# Patient Record
Sex: FEMALE | Race: WHITE | NOT HISPANIC OR LATINO | URBAN - METROPOLITAN AREA
[De-identification: names, ages, dates, MRNs, and addresses within clinical notes are randomized per-mention and may not be internally consistent; named-entity substitution may affect disease eponyms.]

---

## 2017-12-06 ENCOUNTER — EMERGENCY (EMERGENCY)
Facility: HOSPITAL | Age: 47
LOS: 0 days | Discharge: HOME | End: 2017-12-06
Attending: EMERGENCY MEDICINE | Admitting: EMERGENCY MEDICINE
Payer: COMMERCIAL

## 2017-12-06 VITALS
RESPIRATION RATE: 16 BRPM | DIASTOLIC BLOOD PRESSURE: 73 MMHG | SYSTOLIC BLOOD PRESSURE: 110 MMHG | TEMPERATURE: 98 F | OXYGEN SATURATION: 100 % | HEART RATE: 82 BPM

## 2017-12-06 VITALS
OXYGEN SATURATION: 100 % | TEMPERATURE: 98 F | RESPIRATION RATE: 18 BRPM | DIASTOLIC BLOOD PRESSURE: 68 MMHG | HEART RATE: 80 BPM | SYSTOLIC BLOOD PRESSURE: 118 MMHG

## 2017-12-06 DIAGNOSIS — R05 COUGH: ICD-10-CM

## 2017-12-06 DIAGNOSIS — J18.9 PNEUMONIA, UNSPECIFIED ORGANISM: ICD-10-CM

## 2017-12-06 LAB
ALBUMIN SERPL ELPH-MCNC: 3.1 G/DL — LOW (ref 3.3–5)
ALP SERPL-CCNC: 105 U/L — SIGNIFICANT CHANGE UP (ref 40–120)
ALT FLD-CCNC: 17 U/L — SIGNIFICANT CHANGE UP (ref 12–78)
ANION GAP SERPL CALC-SCNC: 5 MMOL/L — SIGNIFICANT CHANGE UP (ref 5–17)
AST SERPL-CCNC: 13 U/L — LOW (ref 15–37)
BASOPHILS # BLD AUTO: 0.1 K/UL — SIGNIFICANT CHANGE UP (ref 0–0.2)
BASOPHILS NFR BLD AUTO: 1.1 % — SIGNIFICANT CHANGE UP (ref 0–2)
BILIRUB SERPL-MCNC: 0.3 MG/DL — SIGNIFICANT CHANGE UP (ref 0.2–1.2)
BUN SERPL-MCNC: 12 MG/DL — SIGNIFICANT CHANGE UP (ref 7–23)
CALCIUM SERPL-MCNC: 8.5 MG/DL — SIGNIFICANT CHANGE UP (ref 8.5–10.1)
CHLORIDE SERPL-SCNC: 107 MMOL/L — SIGNIFICANT CHANGE UP (ref 96–108)
CO2 SERPL-SCNC: 29 MMOL/L — SIGNIFICANT CHANGE UP (ref 22–31)
CREAT SERPL-MCNC: 0.48 MG/DL — LOW (ref 0.5–1.3)
EOSINOPHIL # BLD AUTO: 0.1 K/UL — SIGNIFICANT CHANGE UP (ref 0–0.5)
EOSINOPHIL NFR BLD AUTO: 2.1 % — SIGNIFICANT CHANGE UP (ref 0–6)
GLUCOSE SERPL-MCNC: 63 MG/DL — LOW (ref 70–99)
HCT VFR BLD CALC: 38.1 % — SIGNIFICANT CHANGE UP (ref 34.5–45)
HGB BLD-MCNC: 12.2 G/DL — SIGNIFICANT CHANGE UP (ref 11.5–15.5)
LYMPHOCYTES # BLD AUTO: 2.3 K/UL — SIGNIFICANT CHANGE UP (ref 1–3.3)
LYMPHOCYTES # BLD AUTO: 33.8 % — SIGNIFICANT CHANGE UP (ref 13–44)
MCHC RBC-ENTMCNC: 29.8 PG — SIGNIFICANT CHANGE UP (ref 27–34)
MCHC RBC-ENTMCNC: 32 GM/DL — SIGNIFICANT CHANGE UP (ref 32–36)
MCV RBC AUTO: 92.9 FL — SIGNIFICANT CHANGE UP (ref 80–100)
MONOCYTES # BLD AUTO: 0.4 K/UL — SIGNIFICANT CHANGE UP (ref 0–0.9)
MONOCYTES NFR BLD AUTO: 5.8 % — SIGNIFICANT CHANGE UP (ref 2–14)
NEUTROPHILS # BLD AUTO: 3.8 K/UL — SIGNIFICANT CHANGE UP (ref 1.8–7.4)
NEUTROPHILS NFR BLD AUTO: 57.2 % — SIGNIFICANT CHANGE UP (ref 43–77)
PLATELET # BLD AUTO: 372 K/UL — SIGNIFICANT CHANGE UP (ref 150–400)
POTASSIUM SERPL-MCNC: 3.6 MMOL/L — SIGNIFICANT CHANGE UP (ref 3.5–5.3)
POTASSIUM SERPL-SCNC: 3.6 MMOL/L — SIGNIFICANT CHANGE UP (ref 3.5–5.3)
PROT SERPL-MCNC: 6.1 GM/DL — SIGNIFICANT CHANGE UP (ref 6–8.3)
RBC # BLD: 4.1 M/UL — SIGNIFICANT CHANGE UP (ref 3.8–5.2)
RBC # FLD: 12.3 % — SIGNIFICANT CHANGE UP (ref 10.3–14.5)
SODIUM SERPL-SCNC: 141 MMOL/L — SIGNIFICANT CHANGE UP (ref 135–145)
WBC # BLD: 6.7 K/UL — SIGNIFICANT CHANGE UP (ref 3.8–10.5)
WBC # FLD AUTO: 6.7 K/UL — SIGNIFICANT CHANGE UP (ref 3.8–10.5)

## 2017-12-06 PROCEDURE — 99285 EMERGENCY DEPT VISIT HI MDM: CPT

## 2017-12-06 PROCEDURE — 71250 CT THORAX DX C-: CPT | Mod: 26

## 2017-12-06 RX ORDER — CEFEPIME 1 G/1
2000 INJECTION, POWDER, FOR SOLUTION INTRAMUSCULAR; INTRAVENOUS ONCE
Qty: 0 | Refills: 0 | Status: COMPLETED | OUTPATIENT
Start: 2017-12-06 | End: 2017-12-06

## 2017-12-06 RX ORDER — CEFEPIME 1 G/1
INJECTION, POWDER, FOR SOLUTION INTRAMUSCULAR; INTRAVENOUS
Qty: 0 | Refills: 0 | Status: DISCONTINUED | OUTPATIENT
Start: 2017-12-06 | End: 2017-12-06

## 2017-12-06 RX ORDER — CEFEPIME 1 G/1
2000 INJECTION, POWDER, FOR SOLUTION INTRAMUSCULAR; INTRAVENOUS EVERY 12 HOURS
Qty: 0 | Refills: 0 | Status: DISCONTINUED | OUTPATIENT
Start: 2017-12-06 | End: 2017-12-06

## 2017-12-06 RX ADMIN — CEFEPIME 100 MILLIGRAM(S): 1 INJECTION, POWDER, FOR SOLUTION INTRAMUSCULAR; INTRAVENOUS at 12:23

## 2017-12-06 NOTE — ADVANCED PRACTICE NURSE CONSULT - ASSESSMENT
Rec'd. order to place Midline catheter for patient in ED that was sent to have line placed for long-term IV abx. Explained risks and benefits and obtained verbal consent for midline. Patient A&Ox3 and verbalized understanding. Pt. states she had a midline two years ago and knows how to care for and use it. Inserted Bard Powerglide Pro Midline to left cephalic 18g 10 cm. length via ultrasound guidance with brisk blood return, flushes well w/20 ml. NS. Endcap placed, pt. tolerated well. No CXR needed for midline. Report given to district nurse. Lot # [id number].

## 2017-12-06 NOTE — ED ADULT NURSE NOTE - OBJECTIVE STATEMENT
pt has recurrent Pna , HX CYST in lungs as a child , dm 1 , on insulin pump , pt sent for midline placement by dr. cueva

## 2017-12-06 NOTE — CONSULT NOTE ADULT - ASSESSMENT
48 y/o female with h/o DM type 2 on insulin pump, congenittal cystic lung disease s/p surgery at age 5, prior pneumonia 2 years afo treated with cefepime IV for 4 weeks was sent to ER for IV abx therapy. She developed SOB, dry cough, felt feverish about 3-4 weeks ago. She had a CT chest that showed a LLL pulmonary infiltrate. She received augmentin PO for 2 weeks with partial response (her fenerish feeling improved). Her augmentin was restarted for 10 more days, but she still has a dry cough, feels SOB and fatigue. She was sent for further IV abx.    1. Persistent cough and dyspnea. Left side pneumonia. Cystic lung disease.  -concerned about the possibility of persistent infectious process; the patient did not respond to oral augmentin  -possible MDR organism, mycobacteria and/or fungal infection ?malignancy  -case reviewed with Dr. Hawkins ?bronchoscopy if the patient is agreeable for further diagnosis  -repeat CT chest  -give cefepime 2 gm IV q12h  -reason for abx use and side effects reviewed with patient; monitor BMP   -midline   -home IV abx set up  -monitor temps  -f/u CBC, CMP weekly  -supportive care  2. Other issues:   -care per medicine

## 2017-12-06 NOTE — CONSULT NOTE ADULT - SUBJECTIVE AND OBJECTIVE BOX
Patient is a 47y old  Female who presents with a chief complaint of SOB and cough.  HPI:  48 y/o female with h/o DM type 2 on insulin pump, congenittal cystic lung disease s/p surgery at age 5, prior pneumonia 2 years afo treated with cefepime IV for 4 weeks was sent to ER for IV abx therapy. She developed SOB, dry cough, felt feverish about 3-4 weeks ago. She had a CT chest that showed a LLL pulmonary infiltrate. She received augmentin PO for 2 weeks with partial response (her fenerish feeling improved). Her augmentin was restarted for 10 more days, but she still has a dry cough, feels SOB and fatigue. She was sent for further IV abx.    PMH: as above  PSH: as above  Meds: per reconciliation sheet, noted below  MEDICATIONS  (STANDING):  cefepime  IVPB        MEDICATIONS  (PRN):    Allergies    sulfa drugs (Unknown)    Intolerances      Social: no smoking, no alcohol, no illegal drugs; no recent travel, no exposure to TB  FAMILY HISTORY:    ROS: the patient denies fever, no chills, no HA, no dizziness, no sore throat, no blurry vision, no CP, no palpitations, has SOB, has dry cough, no abdominal pain, no diarrhea, no N/V, no dysuria, no leg pain, no claudication, no rash, no joint aches, no rectal pain or bleeding, no night sweats    Vital Signs Last 24 Hrs  T(C): 36.6 (06 Dec 2017 08:03), Max: 36.6 (06 Dec 2017 08:03)  T(F): 97.8 (06 Dec 2017 08:03), Max: 97.8 (06 Dec 2017 08:03)  HR: 82 (06 Dec 2017 08:03) (82 - 82)  BP: 110/73 (06 Dec 2017 08:03) (110/73 - 110/73)  BP(mean): --  RR: 16 (06 Dec 2017 08:03) (16 - 16)  SpO2: 100% (06 Dec 2017 08:03) (100% - 100%)  Daily Height in cm: 157.48 (06 Dec 2017 08:08)    Daily     PE:    Constitutional: frail looking  HEENT: NC/AT, EOMI, PERRLA  Neck: supple  Back: no tenderness  Respiratory: clear; mild decreased BS at left base  Cardiovascular: S1S2 regular, no murmurs  Abdomen: soft, not tender, not distended, positive BS  Genitourinary: no LN palpable  Rectal: deferred  Musculoskeletal: no muscle tenderness, no joint swelling or tenderness  Extremities: no pedal edema  Neurological: AxOx3, moving all extremities  Skin: no rashes    Labs:    pending               Radiology:    CT chest from 11/16/2017 reviewed: marked alveolar consolidation superior segment LLL and posterior segment YUNIER    Advanced directives addressed: full resuscitation

## 2017-12-06 NOTE — ED ADULT NURSE REASSESSMENT NOTE - NS ED NURSE REASSESS COMMENT FT1
Pt received Midline placement by Iv team, received Dr. cueva consult , Cefepime 2G IV given , pt is receiving  consult for IV antibiotics set up

## 2018-01-11 NOTE — ASU PATIENT PROFILE, ADULT - PMH
Controlled diabetes mellitus type 1 without complications    Recurrent pneumonia Controlled diabetes mellitus type 1 without complications    Lung mass  cysts on left lung  Recurrent pneumonia

## 2018-01-12 ENCOUNTER — OUTPATIENT (OUTPATIENT)
Dept: OUTPATIENT SERVICES | Facility: HOSPITAL | Age: 48
LOS: 1 days | Discharge: HOME | End: 2018-01-12
Payer: COMMERCIAL

## 2018-01-12 ENCOUNTER — RESULT REVIEW (OUTPATIENT)
Age: 48
End: 2018-01-12

## 2018-01-12 VITALS
OXYGEN SATURATION: 100 % | RESPIRATION RATE: 14 BRPM | HEART RATE: 88 BPM | DIASTOLIC BLOOD PRESSURE: 61 MMHG | TEMPERATURE: 99 F | SYSTOLIC BLOOD PRESSURE: 109 MMHG

## 2018-01-12 VITALS
HEIGHT: 62 IN | HEART RATE: 80 BPM | DIASTOLIC BLOOD PRESSURE: 62 MMHG | RESPIRATION RATE: 16 BRPM | OXYGEN SATURATION: 100 % | TEMPERATURE: 98 F | WEIGHT: 106.26 LBS | SYSTOLIC BLOOD PRESSURE: 91 MMHG

## 2018-01-12 DIAGNOSIS — Z98.890 OTHER SPECIFIED POSTPROCEDURAL STATES: Chronic | ICD-10-CM

## 2018-01-12 DIAGNOSIS — J18.9 PNEUMONIA, UNSPECIFIED ORGANISM: ICD-10-CM

## 2018-01-12 DIAGNOSIS — J98.4 OTHER DISORDERS OF LUNG: ICD-10-CM

## 2018-01-12 LAB
ANION GAP SERPL CALC-SCNC: 4 MMOL/L — LOW (ref 5–17)
BUN SERPL-MCNC: 13 MG/DL — SIGNIFICANT CHANGE UP (ref 7–23)
CALCIUM SERPL-MCNC: 9.1 MG/DL — SIGNIFICANT CHANGE UP (ref 8.5–10.1)
CHLORIDE SERPL-SCNC: 106 MMOL/L — SIGNIFICANT CHANGE UP (ref 96–108)
CO2 SERPL-SCNC: 28 MMOL/L — SIGNIFICANT CHANGE UP (ref 22–31)
CREAT SERPL-MCNC: 0.61 MG/DL — SIGNIFICANT CHANGE UP (ref 0.5–1.3)
GLUCOSE BLDC GLUCOMTR-MCNC: 111 MG/DL — HIGH (ref 70–99)
GLUCOSE BLDC GLUCOMTR-MCNC: 131 MG/DL — HIGH (ref 70–99)
GLUCOSE SERPL-MCNC: 134 MG/DL — HIGH (ref 70–99)
GRAM STN FLD: SIGNIFICANT CHANGE UP
HCG UR QL: NEGATIVE — SIGNIFICANT CHANGE UP
HCT VFR BLD CALC: 46.9 % — HIGH (ref 34.5–45)
HGB BLD-MCNC: 14.8 G/DL — SIGNIFICANT CHANGE UP (ref 11.5–15.5)
INR BLD: 0.85 RATIO — LOW (ref 0.88–1.16)
MCHC RBC-ENTMCNC: 28.6 PG — SIGNIFICANT CHANGE UP (ref 27–34)
MCHC RBC-ENTMCNC: 31.6 GM/DL — LOW (ref 32–36)
MCV RBC AUTO: 90.4 FL — SIGNIFICANT CHANGE UP (ref 80–100)
PLATELET # BLD AUTO: 485 K/UL — HIGH (ref 150–400)
POTASSIUM SERPL-MCNC: 4.4 MMOL/L — SIGNIFICANT CHANGE UP (ref 3.5–5.3)
POTASSIUM SERPL-SCNC: 4.4 MMOL/L — SIGNIFICANT CHANGE UP (ref 3.5–5.3)
PROTHROM AB SERPL-ACNC: 9.2 SEC — LOW (ref 9.8–12.7)
RBC # BLD: 5.19 M/UL — SIGNIFICANT CHANGE UP (ref 3.8–5.2)
RBC # FLD: 12.9 % — SIGNIFICANT CHANGE UP (ref 10.3–14.5)
SODIUM SERPL-SCNC: 138 MMOL/L — SIGNIFICANT CHANGE UP (ref 135–145)
SPECIMEN SOURCE: SIGNIFICANT CHANGE UP
WBC # BLD: 6.3 K/UL — SIGNIFICANT CHANGE UP (ref 3.8–10.5)
WBC # FLD AUTO: 6.3 K/UL — SIGNIFICANT CHANGE UP (ref 3.8–10.5)

## 2018-01-12 PROCEDURE — 88108 CYTOPATH CONCENTRATE TECH: CPT | Mod: 26

## 2018-01-12 RX ORDER — FENTANYL CITRATE 50 UG/ML
25 INJECTION INTRAVENOUS
Qty: 0 | Refills: 0 | Status: DISCONTINUED | OUTPATIENT
Start: 2018-01-12 | End: 2018-01-12

## 2018-01-12 RX ORDER — ONDANSETRON 8 MG/1
4 TABLET, FILM COATED ORAL ONCE
Qty: 0 | Refills: 0 | Status: DISCONTINUED | OUTPATIENT
Start: 2018-01-12 | End: 2018-01-12

## 2018-01-12 RX ORDER — ALPRAZOLAM 0.25 MG
0 TABLET ORAL
Qty: 0 | Refills: 0 | COMMUNITY

## 2018-01-12 NOTE — ASU DISCHARGE PLAN (ADULT/PEDIATRIC). - NURSING INSTRUCTIONS
Follow the multicolored discharge instruction sheet given to you.  Remember to  CALL the DOCTOR if:  You have any pain that appears to be getting worse, or you get no relief from pain after taking the pain medicine prescribed for you.  You have  not urinated 8 hours after surgery or have any difficulty urinating.

## 2018-01-12 NOTE — BRIEF OPERATIVE NOTE - PRE-OP DX
Pneumonia of left lung due to infectious organism, unspecified part of lung  01/12/2018  patient left lower cystic lung disease with recurrent pneumonia  Active  Lesly Hawkins

## 2018-01-12 NOTE — BRIEF OPERATIVE NOTE - PROCEDURE
<<-----Click on this checkbox to enter Procedure Bronchoscopy  01/12/2018  patient underwent bronchoscopy under local and iv sedation . vocal inspected with inspection of air ways on both sides . no endobronchial lesions were identifid  and washing were obtain from the left lower bronchus and send for the cultures  Active  LYELLA

## 2018-01-12 NOTE — BRIEF OPERATIVE NOTE - POST-OP DX
Pneumonia of left lung due to infectious organism, unspecified part of lung  01/12/2018    Active  Lesly Hawkins

## 2018-01-13 LAB
NIGHT BLUE STAIN TISS: SIGNIFICANT CHANGE UP
SPECIMEN SOURCE: SIGNIFICANT CHANGE UP

## 2018-01-14 LAB
CULTURE RESULTS: SIGNIFICANT CHANGE UP
SPECIMEN SOURCE: SIGNIFICANT CHANGE UP

## 2018-01-16 LAB — NON-GYN CYTOLOGY SPEC: SIGNIFICANT CHANGE UP

## 2018-01-22 DIAGNOSIS — Z79.4 LONG TERM (CURRENT) USE OF INSULIN: ICD-10-CM

## 2018-01-22 DIAGNOSIS — Z88.2 ALLERGY STATUS TO SULFONAMIDES: ICD-10-CM

## 2018-01-22 DIAGNOSIS — E10.9 TYPE 1 DIABETES MELLITUS WITHOUT COMPLICATIONS: ICD-10-CM

## 2018-01-22 DIAGNOSIS — J18.9 PNEUMONIA, UNSPECIFIED ORGANISM: ICD-10-CM

## 2018-01-22 DIAGNOSIS — J98.4 OTHER DISORDERS OF LUNG: ICD-10-CM

## 2018-02-10 LAB
CULTURE RESULTS: SIGNIFICANT CHANGE UP
SPECIMEN SOURCE: SIGNIFICANT CHANGE UP

## 2018-02-24 LAB — NIGHT BLUE STAIN TISS: SIGNIFICANT CHANGE UP

## 2018-03-03 LAB
CULTURE RESULTS: SIGNIFICANT CHANGE UP
SPECIMEN SOURCE: SIGNIFICANT CHANGE UP

## 2019-10-29 ENCOUNTER — EMERGENCY (EMERGENCY)
Facility: HOSPITAL | Age: 49
LOS: 0 days | Discharge: HOME | End: 2019-10-30
Attending: EMERGENCY MEDICINE
Payer: COMMERCIAL

## 2019-10-29 VITALS
DIASTOLIC BLOOD PRESSURE: 81 MMHG | HEART RATE: 100 BPM | OXYGEN SATURATION: 99 % | TEMPERATURE: 98 F | RESPIRATION RATE: 18 BRPM | SYSTOLIC BLOOD PRESSURE: 131 MMHG

## 2019-10-29 VITALS — WEIGHT: 91.05 LBS

## 2019-10-29 DIAGNOSIS — Z98.890 OTHER SPECIFIED POSTPROCEDURAL STATES: Chronic | ICD-10-CM
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
## 2019-10-29 DIAGNOSIS — Z23 ENCOUNTER FOR IMMUNIZATION: ICD-10-CM

## 2019-10-29 DIAGNOSIS — S61.012A LACERATION WITHOUT FOREIGN BODY OF LEFT THUMB WITHOUT DAMAGE TO NAIL, INITIAL ENCOUNTER: ICD-10-CM

## 2019-10-29 DIAGNOSIS — Z87.01 PERSONAL HISTORY OF PNEUMONIA (RECURRENT): ICD-10-CM
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
## 2019-10-29 DIAGNOSIS — Z88.2 ALLERGY STATUS TO SULFONAMIDES: ICD-10-CM

## 2019-10-29 DIAGNOSIS — E10.9 TYPE 1 DIABETES MELLITUS WITHOUT COMPLICATIONS: ICD-10-CM

## 2019-10-29 PROCEDURE — 73140 X-RAY EXAM OF FINGER(S): CPT | Mod: 26,LT

## 2019-10-29 PROCEDURE — 99283 EMERGENCY DEPT VISIT LOW MDM: CPT | Mod: 25

## 2019-10-29 PROCEDURE — 12001 RPR S/N/AX/GEN/TRNK 2.5CM/<: CPT

## 2019-10-29 PROCEDURE — 73140 X-RAY EXAM OF FINGER(S): CPT | Mod: LT

## 2019-10-29 PROCEDURE — 90471 IMMUNIZATION ADMIN: CPT

## 2019-10-29 PROCEDURE — 99284 EMERGENCY DEPT VISIT MOD MDM: CPT

## 2019-10-29 PROCEDURE — 90715 TDAP VACCINE 7 YRS/> IM: CPT

## 2019-10-29 RX ORDER — CEPHALEXIN 500 MG
500 CAPSULE ORAL ONCE
Refills: 0 | Status: DISCONTINUED | OUTPATIENT
Start: 2019-10-29 | End: 2019-10-30

## 2019-10-29 RX ORDER — TETANUS TOXOID, REDUCED DIPHTHERIA TOXOID AND ACELLULAR PERTUSSIS VACCINE, ADSORBED 5; 2.5; 8; 8; 2.5 [IU]/.5ML; [IU]/.5ML; UG/.5ML; UG/.5ML; UG/.5ML
0.5 SUSPENSION INTRAMUSCULAR ONCE
Refills: 0 | Status: COMPLETED | OUTPATIENT
Start: 2019-10-29 | End: 2019-10-29

## 2019-10-29 RX ADMIN — TETANUS TOXOID, REDUCED DIPHTHERIA TOXOID AND ACELLULAR PERTUSSIS VACCINE, ADSORBED 0.5 MILLILITER(S): 5; 2.5; 8; 8; 2.5 SUSPENSION INTRAMUSCULAR at 23:02

## 2019-10-29 NOTE — ED ADULT NURSE NOTE - OBJECTIVE STATEMENT
pt reports laceration to left thumb s/p slicing food. RN unable to visualize would because it was wrapped prior to arrival. No drainage evident on dressing. pt in no acute distress at this time. GLORIA bauman at bedside for laceration repair. will administer abx and dc patient.

## 2019-10-29 NOTE — ED ADULT NURSE NOTE - NSIMPLEMENTINTERV_GEN_ALL_ED
Implemented All Universal Safety Interventions:  [place] to call system. Call bell, personal items and telephone within reach. Instruct patient to call for assistance. Room bathroom lighting operational. Non-slip footwear when patient is off stretcher. Physically safe environment: no spills, clutter or unnecessary equipment. Stretcher in lowest position, wheels locked, appropriate side rails in place.

## 2019-10-29 NOTE — ED ADULT NURSE NOTE - PMH
Controlled diabetes mellitus type 1 without complications    Lung mass  cysts on left lung  Recurrent pneumonia

## 2019-10-30 PROBLEM — E10.9 TYPE 1 DIABETES MELLITUS WITHOUT COMPLICATIONS: Chronic | Status: ACTIVE | Noted: 2017-12-06

## 2019-10-30 PROBLEM — R91.8 OTHER NONSPECIFIC ABNORMAL FINDING OF LUNG FIELD: Chronic | Status: ACTIVE | Noted: 2018-01-12

## 2019-10-30 PROBLEM — J18.9 PNEUMONIA, UNSPECIFIED ORGANISM: Chronic | Status: ACTIVE | Noted: 2017-12-06

## 2019-10-30 NOTE — ED STATDOCS - PATIENT PORTAL LINK FT
You can access the FollowMyHealth Patient Portal offered by [facility] by registering at the following website: [email or web address]. By joining [employer]’s FollowMyHealth portal, you will also be able to view your health information using other applications (apps) compatible with our system.

## 2019-10-30 NOTE — ED STATDOCS - SKIN, MLM
skin normal color for race, warm, dry and intact. LEFT THUMB: +1.5cm skin flap type laceration noted distal phalanx of left 1st digit over radial aspect. No tendon deficit. FROM left 1st digit. 5/5 motor and sensory. NVI. Cap refill less than 2 sec. No injury to thumbnail or nailbed. No bony deformity.

## 2019-10-30 NOTE — ED PROCEDURE NOTE - CPROC ED ANATOMIC LOCATION1
hand/left/LEFT THUMB: +1.5cm skin flap type laceration noted distal phalanx of left 1st digit over radial aspect. No tendon deficit. FROM left 1st digit. 5/5 motor and sensory. NVI. Cap refill less than 2 sec. No injury to thumbnail or nailbed. No bony deformity.

## 2019-10-30 NOTE — ED STATDOCS - CLINICAL SUMMARY MEDICAL DECISION MAKING FREE TEXT BOX
48 yo WF ambulatory to ED w/ accidental L thumb lac by sharp knife by slicing motion.  + NVI.  1.5 cm flap-type lac distal L thumb, mild oozing of blood.  Td NOT UTD.  Plan: Tdap, XRs L thumb, lac repair. 50 yo WF ambulatory to ED w/ accidental L thumb lac by sharp knife by slicing motion.  + NVI.  1.5 cm flap-type lac distal L thumb, mild oozing of blood.  Td NOT UTD.  Plan: Tdap, XRs L thumb, lac repair, po Keflex admin. in ED. 50 yo WF ambulatory to ED w/ accidental L thumb lac by sharp knife by slicing motion.  + NVI.  1.5 cm flap-type lac distal L thumb, mild oozing of blood.  Td NOT UTD.  Plan: Tdap, XRs L thumb, lac repair.

## 2019-10-30 NOTE — ED STATDOCS - OBJECTIVE STATEMENT
Patient is a 49 year old female with PMHx of lung cysts and Diabetes who presented to [facility] with left thumb laceration. Patient accidentally stabbed her left thumb with a knife when working in the kitchen. DENIES tingling/numbness/weakness in left thumb and/or distal to the injury. No excessive bleeding. TDAP NOT up to date. ~GLORIA Vargas Patient is a 49 year old female with PMHx of lung cysts and Diabetes who presented to ED with left thumb laceration. Patient accidentally stabbed her left thumb with a knife when working in the kitchen. DENIES tingling/numbness/weakness in left thumb and/or distal to the injury. No excessive bleeding. TDAP NOT up to date. ~GLORIA Vargas Dr., ED Attdg MD Note;   Pt seen/evaluated in ED on 10/29.  ED chart completed 10/31.    50 yo WF, h/o DMt1 on insulin, ambulatory to ED with L thumb lac for repair.  Incident occurred between 6:30 & 7 PM: while trying to separate frozen sausages with a sharp knife, the knife slipped off & sliced her L thumb.  Pt is R hand-dominant.  + Initial bleeding, non-pulsatile.  + Moderate throbbing pain locally at wound site, no distal weak/numb/paresthesias of L thumb.  No other injury, compalint.     Last Td: ?.    All: sulfa.    PCP: none.   Pulm: Shruthi.

## 2019-10-30 NOTE — ED STATDOCS - SKIN NEGATIVE STATEMENT, MLM
no abrasions, no jaundice, no lesions, no pruritis, and no rashes. +Left thumb laceration. ~GLORIA Vargas

## 2019-10-30 NOTE — ED STATDOCS - ATTENDING CONTRIBUTION TO CARE
I, Sulaiman Gonzales MD, personally saw the patient with the PA, and completed the key components of the history and physical exam. I then discussed the management plan with the PA.

## 2019-10-30 NOTE — ED STATDOCS - PROGRESS NOTE DETAILS
LAC repaired under sterile fashion. See procedure note.     Patient re-examined and re-evaluated. Patient feels much better at this time. ED evaluation, Diagnosis and management discussed with the patient and family in detail, discussed with ED attending RAHEEM Euceda to dc home. Close PMD follow up encouraged. Strict ED return instructions discussed in detail and patient given the opportunity to ask any questions about their discharge diagnosis and instructions. Suture removal in 10-12 days. Patient verbalized understanding. ~ GLORIA Vargas

## 2019-10-30 NOTE — ED STATDOCS - PHYSICAL EXAMINATION
H&P note by ~GLORIA Vargas H&P note by ~GLORIA Vargas Dr.:  WD/WN WF adult, mildly anxious, mild discomfort due to pain of L thumb lac.  Non-toxic.  L Thumb: + 1.5 cm flap-type lac of distal phalynx, mildly oozing blood; AFROM 1st MCP & IP jts with normal motor, distal LT intact, CR < 2 secs.

## 2020-01-30 NOTE — ASU PATIENT PROFILE, ADULT - CAREGIVER NAME
Idalia is a 5 y/o F w/ spina bifida who presents for treatment of Pseudomonas UTI.    UTI 2/2 to pseudomonas infection  D/c home on ciprofloxacin to complete 10 d course followed by nitrofurantoin  - Peds Urology consulted--will f/u as out pt with urodynamics    Constipation  - s/p clean out - mom to continue miralax at home    D/c home today w/ f/u   Anthony Labadie

## 2020-03-20 ENCOUNTER — EMERGENCY (EMERGENCY)
Facility: HOSPITAL | Age: 50
LOS: 0 days | Discharge: HOME | End: 2020-03-20
Payer: COMMERCIAL

## 2020-03-20 VITALS
SYSTOLIC BLOOD PRESSURE: 110 MMHG | TEMPERATURE: 99 F | OXYGEN SATURATION: 100 % | DIASTOLIC BLOOD PRESSURE: 67 MMHG | RESPIRATION RATE: 18 BRPM | HEART RATE: 100 BPM

## 2020-03-20 DIAGNOSIS — B34.9 VIRAL INFECTION, UNSPECIFIED: ICD-10-CM

## 2020-03-20 DIAGNOSIS — Z98.890 OTHER SPECIFIED POSTPROCEDURAL STATES: Chronic | ICD-10-CM

## 2020-03-20 DIAGNOSIS — M79.10 MYALGIA, UNSPECIFIED SITE: ICD-10-CM

## 2020-03-20 DIAGNOSIS — Z88.2 ALLERGY STATUS TO SULFONAMIDES: ICD-10-CM

## 2020-03-20 DIAGNOSIS — R06.02 SHORTNESS OF BREATH: ICD-10-CM

## 2020-03-20 DIAGNOSIS — E11.9 TYPE 2 DIABETES MELLITUS WITHOUT COMPLICATIONS: ICD-10-CM

## 2020-03-20 PROCEDURE — U0001: CPT

## 2020-03-20 PROCEDURE — 99283 EMERGENCY DEPT VISIT LOW MDM: CPT

## 2020-03-20 NOTE — ED STATDOCS - OBJECTIVE STATEMENT
48 y/o Female with PMHx of IDDM, chronic lung infection, presented to ed with c/c of bodyaches., fatigue, congestion for few days.  Occasion SOB with exertion at work.  Traveled to [place] earlier in Month and is exposed to public at work in  [place].  P t states she has been taking tylenol for symptoms.  pt denies any chest pain, numbness, tingling abdominal pain, nausea, vomiting or any other complaints.  Pt recently exposed to COVID-19. Pt here for testing. Reports glucose was 180 last night.

## 2020-03-20 NOTE — ED STATDOCS - PATIENT PORTAL LINK FT
You can access the FollowMyHealth Patient Portal offered by [facility] by registering at the following website: [email or web address]. By joining [employer]’s FollowMyHealth portal, you will also be able to view your health information using other applications (apps) compatible with our system.

## 2020-03-22 LAB — SARS-COV-2 RNA SPEC QL NAA+PROBE: SIGNIFICANT CHANGE UP

## 2020-04-03 ENCOUNTER — EMERGENCY (EMERGENCY)
Facility: HOSPITAL | Age: 50
LOS: 0 days | Discharge: HOME | End: 2020-04-03
Attending: EMERGENCY MEDICINE
Payer: COMMERCIAL

## 2020-04-03 VITALS
WEIGHT: 104.94 LBS | TEMPERATURE: 99 F | HEART RATE: 108 BPM | RESPIRATION RATE: 20 BRPM | SYSTOLIC BLOOD PRESSURE: 114 MMHG | DIASTOLIC BLOOD PRESSURE: 80 MMHG | OXYGEN SATURATION: 100 %

## 2020-04-03 DIAGNOSIS — R50.9 FEVER, UNSPECIFIED: ICD-10-CM

## 2020-04-03 DIAGNOSIS — B34.9 VIRAL INFECTION, UNSPECIFIED: ICD-10-CM

## 2020-04-03 DIAGNOSIS — Z98.890 OTHER SPECIFIED POSTPROCEDURAL STATES: Chronic | ICD-10-CM

## 2020-04-03 DIAGNOSIS — E11.65 TYPE 2 DIABETES MELLITUS WITH HYPERGLYCEMIA: ICD-10-CM

## 2020-04-03 DIAGNOSIS — M79.10 MYALGIA, UNSPECIFIED SITE: ICD-10-CM

## 2020-04-03 DIAGNOSIS — Z79.1 LONG TERM (CURRENT) USE OF NON-STEROIDAL ANTI-INFLAMMATORIES (NSAID): ICD-10-CM

## 2020-04-03 DIAGNOSIS — R00.0 TACHYCARDIA, UNSPECIFIED: ICD-10-CM

## 2020-04-03 DIAGNOSIS — R19.7 DIARRHEA, UNSPECIFIED: ICD-10-CM

## 2020-04-03 PROCEDURE — 93005 ELECTROCARDIOGRAM TRACING: CPT

## 2020-04-03 PROCEDURE — 99283 EMERGENCY DEPT VISIT LOW MDM: CPT

## 2020-04-03 PROCEDURE — 93010 ELECTROCARDIOGRAM REPORT: CPT

## 2020-04-03 PROCEDURE — 82962 GLUCOSE BLOOD TEST: CPT

## 2020-04-03 PROCEDURE — 99283 EMERGENCY DEPT VISIT LOW MDM: CPT | Mod: 25

## 2020-04-03 PROCEDURE — 87635 SARS-COV-2 COVID-19 AMP PRB: CPT

## 2020-04-03 NOTE — ED STATDOCS - OBJECTIVE STATEMENT
50 y/o Female with PMHx of IDDM, Chronic lung scarring s/p infection as child presents to ED c/o of bodyaches (back, chest tightness for few days).  Associated with diarrhea (loose stool 1-2 episodes a day).  Neg fevers, cough, SOB.  Pt taking tylenol for symptoms. pt denies any chest pain, sob, dyspnea, numbness, tingling abdominal pain, nausea, vomiting or any other complaints.  Pt recently exposed to COVID-19. Pt here for testing.

## 2020-04-03 NOTE — ED STATDOCS - PATIENT PORTAL LINK FT
You can access the FollowMyHealth Patient Portal offered by [facility] by registering at the following website: [email or web address]. By joining [employer]’s FollowMyHealth portal, you will also be able to view your health information using other applications (apps) compatible with our system.

## 2020-04-03 NOTE — ED STATDOCS - NS ED ROS FT
ROS: Constitutional- Neg fever, +chills +Bodyaches (back and chest).  Respiratory- NEg cough, Neg SOB  Cardiac- no chest pain, no palpitations, ENT- +rhinorrhea, no sore throat, no congestion.  Abdomen- No nausea, no vomiting, + diarrhea.  Urinary- no dysuria, no urgency, no frequency.  Skin- No rashes

## 2020-04-03 NOTE — ED STATDOCS - PHYSICAL EXAMINATION
Constitutional: NAD AAOx3  Eyes: EOMI, pupils equal  Head: Normocephalic atraumatic  Mouth: no airway obstruction  Cardiac: regular rate   Resp: Lungs CTAB  GI: Abd s/nt/nd  Neuro: CN2-12 intact  Skin: No rashes    Fs was 215 mg/dl in ED.    Pt ate banana prior to coming to ED s insulin.      EKG performed and reviewed with Dr. Cabrera.  Neg Acute changes.

## 2020-04-04 LAB — SARS-COV-2 RNA SPEC QL NAA+PROBE: SIGNIFICANT CHANGE UP

## 2020-09-14 ENCOUNTER — EMERGENCY (EMERGENCY)
Facility: HOSPITAL | Age: 50
LOS: 0 days | Discharge: HOME | End: 2020-09-14
Attending: EMERGENCY MEDICINE
Payer: COMMERCIAL

## 2020-09-14 VITALS
HEART RATE: 100 BPM | TEMPERATURE: 98 F | RESPIRATION RATE: 17 BRPM | OXYGEN SATURATION: 100 % | SYSTOLIC BLOOD PRESSURE: 100 MMHG | DIASTOLIC BLOOD PRESSURE: 72 MMHG

## 2020-09-14 VITALS
DIASTOLIC BLOOD PRESSURE: 65 MMHG | RESPIRATION RATE: 16 BRPM | HEART RATE: 105 BPM | OXYGEN SATURATION: 100 % | HEIGHT: 72 IN | WEIGHT: 98.11 LBS | SYSTOLIC BLOOD PRESSURE: 128 MMHG

## 2020-09-14 DIAGNOSIS — Z88.2 ALLERGY STATUS TO SULFONAMIDES: ICD-10-CM

## 2020-09-14 DIAGNOSIS — E10.649 TYPE 1 DIABETES MELLITUS WITH HYPOGLYCEMIA WITHOUT COMA: ICD-10-CM

## 2020-09-14 DIAGNOSIS — Z96.41 PRESENCE OF INSULIN PUMP (EXTERNAL) (INTERNAL): ICD-10-CM

## 2020-09-14 DIAGNOSIS — Z98.890 OTHER SPECIFIED POSTPROCEDURAL STATES: Chronic | ICD-10-CM

## 2020-09-14 LAB
ALBUMIN SERPL ELPH-MCNC: 3.2 G/DL — LOW (ref 3.3–5)
ALP SERPL-CCNC: 89 U/L — SIGNIFICANT CHANGE UP (ref 40–120)
ALT FLD-CCNC: 13 U/L — SIGNIFICANT CHANGE UP (ref 12–78)
ANION GAP SERPL CALC-SCNC: 2 MMOL/L — LOW (ref 5–17)
APPEARANCE UR: CLEAR — SIGNIFICANT CHANGE UP
APTT BLD: 31.8 SEC — SIGNIFICANT CHANGE UP (ref 27.5–35.5)
AST SERPL-CCNC: 12 U/L — LOW (ref 15–37)
BASOPHILS # BLD AUTO: 0.02 K/UL — SIGNIFICANT CHANGE UP (ref 0–0.2)
BASOPHILS NFR BLD AUTO: 0.3 % — SIGNIFICANT CHANGE UP (ref 0–2)
BILIRUB SERPL-MCNC: 0.3 MG/DL — SIGNIFICANT CHANGE UP (ref 0.2–1.2)
BILIRUB UR-MCNC: NEGATIVE — SIGNIFICANT CHANGE UP
BUN SERPL-MCNC: 9 MG/DL — SIGNIFICANT CHANGE UP (ref 7–23)
CALCIUM SERPL-MCNC: 8.8 MG/DL — SIGNIFICANT CHANGE UP (ref 8.5–10.1)
CHLORIDE SERPL-SCNC: 109 MMOL/L — HIGH (ref 96–108)
CO2 SERPL-SCNC: 29 MMOL/L — SIGNIFICANT CHANGE UP (ref 22–31)
COLOR SPEC: YELLOW — SIGNIFICANT CHANGE UP
CREAT SERPL-MCNC: 0.52 MG/DL — SIGNIFICANT CHANGE UP (ref 0.5–1.3)
DIFF PNL FLD: NEGATIVE — SIGNIFICANT CHANGE UP
EOSINOPHIL # BLD AUTO: 0.03 K/UL — SIGNIFICANT CHANGE UP (ref 0–0.5)
EOSINOPHIL NFR BLD AUTO: 0.5 % — SIGNIFICANT CHANGE UP (ref 0–6)
GLUCOSE SERPL-MCNC: 194 MG/DL — HIGH (ref 70–99)
GLUCOSE UR QL: 1000 MG/DL
HCT VFR BLD CALC: 41.3 % — SIGNIFICANT CHANGE UP (ref 34.5–45)
HGB BLD-MCNC: 12.9 G/DL — SIGNIFICANT CHANGE UP (ref 11.5–15.5)
IMM GRANULOCYTES NFR BLD AUTO: 0.3 % — SIGNIFICANT CHANGE UP (ref 0–1.5)
INR BLD: 0.8 RATIO — LOW (ref 0.88–1.16)
KETONES UR-MCNC: NEGATIVE — SIGNIFICANT CHANGE UP
LEUKOCYTE ESTERASE UR-ACNC: NEGATIVE — SIGNIFICANT CHANGE UP
LYMPHOCYTES # BLD AUTO: 0.95 K/UL — LOW (ref 1–3.3)
LYMPHOCYTES # BLD AUTO: 15.8 % — SIGNIFICANT CHANGE UP (ref 13–44)
MCHC RBC-ENTMCNC: 29.4 PG — SIGNIFICANT CHANGE UP (ref 27–34)
MCHC RBC-ENTMCNC: 31.2 GM/DL — LOW (ref 32–36)
MCV RBC AUTO: 94.1 FL — SIGNIFICANT CHANGE UP (ref 80–100)
MONOCYTES # BLD AUTO: 0.39 K/UL — SIGNIFICANT CHANGE UP (ref 0–0.9)
MONOCYTES NFR BLD AUTO: 6.5 % — SIGNIFICANT CHANGE UP (ref 2–14)
NEUTROPHILS # BLD AUTO: 4.61 K/UL — SIGNIFICANT CHANGE UP (ref 1.8–7.4)
NEUTROPHILS NFR BLD AUTO: 76.6 % — SIGNIFICANT CHANGE UP (ref 43–77)
NITRITE UR-MCNC: NEGATIVE — SIGNIFICANT CHANGE UP
PH UR: 6.5 — SIGNIFICANT CHANGE UP (ref 5–8)
PLATELET # BLD AUTO: 378 K/UL — SIGNIFICANT CHANGE UP (ref 150–400)
POTASSIUM SERPL-MCNC: 4.4 MMOL/L — SIGNIFICANT CHANGE UP (ref 3.5–5.3)
POTASSIUM SERPL-SCNC: 4.4 MMOL/L — SIGNIFICANT CHANGE UP (ref 3.5–5.3)
PROT SERPL-MCNC: 6.7 GM/DL — SIGNIFICANT CHANGE UP (ref 6–8.3)
PROT UR-MCNC: NEGATIVE MG/DL — SIGNIFICANT CHANGE UP
PROTHROM AB SERPL-ACNC: 9.5 SEC — LOW (ref 10.6–13.6)
RBC # BLD: 4.39 M/UL — SIGNIFICANT CHANGE UP (ref 3.8–5.2)
RBC # FLD: 12.3 % — SIGNIFICANT CHANGE UP (ref 10.3–14.5)
SODIUM SERPL-SCNC: 140 MMOL/L — SIGNIFICANT CHANGE UP (ref 135–145)
SP GR SPEC: 1.02 — SIGNIFICANT CHANGE UP (ref 1.01–1.02)
UROBILINOGEN FLD QL: NEGATIVE MG/DL — SIGNIFICANT CHANGE UP
WBC # BLD: 6.02 K/UL — SIGNIFICANT CHANGE UP (ref 3.8–10.5)
WBC # FLD AUTO: 6.02 K/UL — SIGNIFICANT CHANGE UP (ref 3.8–10.5)

## 2020-09-14 PROCEDURE — 71045 X-RAY EXAM CHEST 1 VIEW: CPT

## 2020-09-14 PROCEDURE — 71045 X-RAY EXAM CHEST 1 VIEW: CPT | Mod: 26

## 2020-09-14 PROCEDURE — 99285 EMERGENCY DEPT VISIT HI MDM: CPT

## 2020-09-14 PROCEDURE — 85025 COMPLETE CBC W/AUTO DIFF WBC: CPT

## 2020-09-14 PROCEDURE — 85730 THROMBOPLASTIN TIME PARTIAL: CPT

## 2020-09-14 PROCEDURE — 82962 GLUCOSE BLOOD TEST: CPT

## 2020-09-14 PROCEDURE — 81003 URINALYSIS AUTO W/O SCOPE: CPT

## 2020-09-14 PROCEDURE — 87086 URINE CULTURE/COLONY COUNT: CPT

## 2020-09-14 PROCEDURE — 93005 ELECTROCARDIOGRAM TRACING: CPT

## 2020-09-14 PROCEDURE — 99283 EMERGENCY DEPT VISIT LOW MDM: CPT | Mod: 25

## 2020-09-14 PROCEDURE — 80053 COMPREHEN METABOLIC PANEL: CPT

## 2020-09-14 PROCEDURE — 36415 COLL VENOUS BLD VENIPUNCTURE: CPT

## 2020-09-14 PROCEDURE — 85610 PROTHROMBIN TIME: CPT

## 2020-09-14 PROCEDURE — 93010 ELECTROCARDIOGRAM REPORT: CPT

## 2020-09-14 NOTE — ED PROVIDER NOTE - CLINICAL SUMMARY MEDICAL DECISION MAKING FREE TEXT BOX
49F hx DM1 on insulin pump presents to the ED for hypoglycemia. pt has had a chronic lung infection following with pulm on augmentin. since this past week blood sugars have been high so last night took extra insulin humalog at 11pm. this morning woke up hypoglycemia stumbling with speech fs 20. family gave glucose and called ems. on arrival pt at baseline bs 200s. denies f/c/cp/sob/ha/dizziness/abd pain nausea vomit. exam non-focal. pt with normal bs now. humalog short acting and taken last night. pt told to monitor bs today and follow up endo. Javier Aguirre M.D., Attending Physician

## 2020-09-14 NOTE — ED PROVIDER NOTE - PATIENT PORTAL LINK FT
You can access the FollowMyHealth Patient Portal offered by [facility] by registering at the following website: [email or web address]. By joining [employer]’s FollowMyHealth portal, you will also be able to view your health information using other applications (apps) compatible with our system.

## 2020-09-14 NOTE — ED PROVIDER NOTE - OBJECTIVE STATEMENT
49F hx DM1 on insulin pump presents to the ED for hypoglycemia. pt has had a chronic lung infection following with pulm on augmentin. since this past week blood sugars have been high so last night took extra insulin humalog at 11pm. this morning woke up hypoglycemia stumbling with speech fs 20. family gave glucose and called ems. on arrival pt at baseline bs 200s. denies f/c/cp/sob/ha/dizziness/abd pain nausea vomit.

## 2020-09-14 NOTE — ED ADULT NURSE NOTE - NSIMPLEMENTINTERV_GEN_ALL_ED
Implemented All Fall Risk Interventions:  [place] to call system. Call bell, personal items and telephone within reach. Instruct patient to call for assistance. Room bathroom lighting operational. Non-slip footwear when patient is off stretcher. Physically safe environment: no spills, clutter or unnecessary equipment. Stretcher in lowest position, wheels locked, appropriate side rails in place. Provide visual cue, wrist band, yellow gown, etc. Monitor gait and stability. Monitor for mental status changes and reorient to person, place, and time. Review medications for side effects contributing to fall risk. Reinforce activity limits and safety measures with patient and family.

## 2020-09-14 NOTE — ED ADULT TRIAGE NOTE - CHIEF COMPLAINT QUOTE
Pt. to the ED BIBA from home C/O Hypoglycemia- Pt. states she administered 20 Units of Humalog last night because Glucose levels were high.  EMS states that patient was lethargic and unresponsive on arrival.  administered PO Glucose and EMS medicated with d50 IVP PTA-- Pt. reports hx of Diabetes and on Insulin Pump. Pt. also states that BGM Levels have been out of control because of recent Lung Infections. ( On Augmentin) -  at arrival. Pt. awake and alert x 4 at this time.

## 2020-09-14 NOTE — ED PROVIDER NOTE - NSFOLLOWUPINSTRUCTIONS_ED_ALL_ED_FT
1. return for worsening symptoms or anything concerning to you  2. take all home meds as prescribed  3. follow up with your pmd call to make an appointment  4. follow up with your endocrinologist    What to Do if Your Blood Sugar is Low    WHAT YOU NEED TO KNOW:    Low blood sugar levels can cause you to have falls, accidents, and injuries. A blood sugar level that gets too low can lead to seizures, coma, and death. Learn to recognize the symptoms early so you can get treatment quickly.     DISCHARGE INSTRUCTIONS:    Have someone call 911 if:     You cannot be woken.      You have a seizure.    Return to the emergency department if:     You have symptoms of a low blood sugar level, such as trouble thinking, sweating, or a pounding heartbeat.      Your blood sugar level is lower than normal and it does not improve with treatment.     Contact your healthcare provider if:     You often have lower blood sugar levels than your target goals.      You have trouble coping with your illness, or you feel anxious or depressed.       You have questions or concerns about your condition or care.     What to do if you have symptoms of low blood sugar: If you are sweaty, anxious, confused or have a fast, pounding heartbeat:     Check your blood sugar level, if possible. Your blood sugar level is too low if it is at or below 70 mg/dL.       Eat or drink 15 grams of fast-acting carbohydrate. Fast-acting carbohydrates will raise your blood sugar level quickly. Examples of 15 grams of fast-acting carbohydrates:   4 ounces (½ cup) of fruit juice       4 ounces of regular soda      2 tablespoons of raisins       1 tube of glucose gel or 3 to 4 glucose tablets  Ways to Raise Your Blood Sugar         Check your blood sugar level 15 minutes later. If the level is still low (less than 100 mg/dL), eat another 15 grams of carbohydrate. When the level returns to 100 mg/dL, eat a snack or meal that contains carbohydrates. This will help prevent another drop in blood sugar.      Teach people close to you how to use your glucagon kit. Your blood sugar may be too low for you to be awake. People need to know when and how to use your kit.    Prevent low blood sugar levels: Prevent low blood sugar by knowing what increases your risk. Ask your healthcare provider for ways to prevent low blood sugar levels. Any of the following can increase your risk of low blood sugar:     Fasting for tests or procedures      During or after intense exercise      Late or postponed meals      Sleeping (you may need a bedtime snack)     Follow up with your healthcare provider as directed: Write down your questions so you remember to ask them during your visits.

## 2020-09-14 NOTE — ED ADULT NURSE NOTE - OBJECTIVE STATEMENT
c/o hypoglycemic episode this morning, glucose reading on insulin pump was "low", repeat was 20, pt states she was having seizure like activity but was aware of what was going on, pt states she has been taking augmentin 875mg bid for chronic lung infection for 1 1/2 week, pt states her glucose levels has been elevated this past week, last night her glucose reading stated "HI", and she took 20 units of humolog last night by injection HX: chronic lung infection secondary cyst in lung as an infant, heart murmur, diabetes type 1

## 2020-09-15 LAB
CULTURE RESULTS: SIGNIFICANT CHANGE UP
SPECIMEN SOURCE: SIGNIFICANT CHANGE UP

## 2022-07-26 ENCOUNTER — NON-APPOINTMENT (OUTPATIENT)
Age: 52
End: 2022-07-26

## 2022-12-01 NOTE — ED STATDOCS - SUICIDE SCREENING DEPRESSION
Relevant Problems   No relevant active problems       Anesthetic History   No history of anesthetic complications            Review of Systems / Medical History  Patient summary reviewed, nursing notes reviewed and pertinent labs reviewed    Pulmonary          Smoker  Asthma        Neuro/Psych         Headaches and psychiatric history    Comments: Anxiety/depression Cardiovascular    Hypertension: well controlled              Exercise tolerance: >4 METS     GI/Hepatic/Renal     GERD          Comments: GI bleed Endo/Other      Hypothyroidism  Arthritis and anemia     Other Findings   Comments: Fibromylagia           Physical Exam    Airway  Mallampati: II    Neck ROM: normal range of motion   Mouth opening: Normal     Cardiovascular    Rhythm: regular  Rate: normal         Dental    Dentition: Lower dentition intact, Poor dentition and Full upper dentures  Comments: No teeth on top   Pulmonary  Breath sounds clear to auscultation               Abdominal         Other Findings            Anesthetic Plan    ASA: 3  Anesthesia type: MAC            Anesthetic plan and risks discussed with: Patient      Informed consent obtained. Negative

## 2024-02-06 ENCOUNTER — APPOINTMENT (OUTPATIENT)
Dept: OTOLARYNGOLOGY | Facility: CLINIC | Age: 54
End: 2024-02-06

## 2024-06-09 ENCOUNTER — NON-APPOINTMENT (OUTPATIENT)
Age: 54
End: 2024-06-09

## 2025-05-28 NOTE — ASU PREOP CHECKLIST - BSA (M2)
----- Message from Kerry STOCKTON sent at 5/21/2025 10:58 AM CDT -----  Regarding: REQUEST BREAST IMAGES  Outside Film Request    Patient Name:  Larry Vines  MRN:  [id number]    Type of Images/Reports requested:  ALL BREAST IMAGING  Approximate date of prior imaging:  Ordering provider:      Prior Imaging Facility:    Name: [facility]  Address: [address] [place] [address]/State: [place], [place]    Phone: [phone]  Fax:  [phone]  
1.46